# Patient Record
Sex: FEMALE | Race: WHITE | NOT HISPANIC OR LATINO | URBAN - METROPOLITAN AREA
[De-identification: names, ages, dates, MRNs, and addresses within clinical notes are randomized per-mention and may not be internally consistent; named-entity substitution may affect disease eponyms.]

---

## 2018-11-21 ENCOUNTER — OFFICE VISIT (OUTPATIENT)
Dept: URBAN - METROPOLITAN AREA CLINIC 18 | Facility: CLINIC | Age: 73
End: 2018-11-21
Payer: MEDICARE

## 2018-11-21 DIAGNOSIS — H43.813 VITREOUS DEGENERATION, BILATERAL: ICD-10-CM

## 2018-11-21 DIAGNOSIS — H35.3291: Primary | ICD-10-CM

## 2018-11-21 DIAGNOSIS — Z96.1 PRESENCE OF INTRAOCULAR LENS: ICD-10-CM

## 2018-11-21 DIAGNOSIS — H35.3192 NONEXUDATIVE AGE-RELATED MACULAR DEGENERATION, UNSPECIFIED EYE, INTERMEDIATE DRY STAGE: ICD-10-CM

## 2018-11-21 PROCEDURE — 92004 COMPRE OPH EXAM NEW PT 1/>: CPT | Performed by: OPTOMETRIST

## 2018-11-21 PROCEDURE — 92134 CPTRZ OPH DX IMG PST SGM RTA: CPT | Performed by: OPTOMETRIST

## 2018-11-21 PROCEDURE — 92014 COMPRE OPH EXAM EST PT 1/>: CPT | Performed by: OPTOMETRIST

## 2018-11-21 ASSESSMENT — INTRAOCULAR PRESSURE
OD: 13
OS: 12

## 2018-11-21 NOTE — IMPRESSION/PLAN
Impression: Nonexudative age-related macular degeneration, unspecified eye, intermediate dry stage: H35.3192. OD. Plan: Discussed diagnosis in detail with patient. No treatment is required at this time. Will continue to observe condition and or symptoms.

## 2018-11-21 NOTE — IMPRESSION/PLAN
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 OS. Plan: Macular degeneration, wet type, appears progressive. Will refer the patient to a retinal specialist for consult.

## 2018-12-18 ENCOUNTER — OFFICE VISIT (OUTPATIENT)
Dept: URBAN - METROPOLITAN AREA CLINIC 17 | Facility: CLINIC | Age: 73
End: 2018-12-18
Payer: MEDICARE

## 2018-12-18 DIAGNOSIS — H35.3221 EXUDATIVE AGE-RELATED MACULAR DEGENERATION OF LEFT EYE W/ ACTIVE CHOROIDAL NEOVASCULARIZATION: Primary | ICD-10-CM

## 2018-12-18 DIAGNOSIS — H35.3112 NONEXUDATIVE AGE-RELATED MACULAR DEGENERATION, RIGHT EYE, INTERMEDIATE DRY STAGE: ICD-10-CM

## 2018-12-18 PROCEDURE — 92014 COMPRE OPH EXAM EST PT 1/>: CPT | Performed by: OPHTHALMOLOGY

## 2018-12-18 PROCEDURE — 67028 INJECTION EYE DRUG: CPT | Performed by: OPHTHALMOLOGY

## 2018-12-18 PROCEDURE — 92134 CPTRZ OPH DX IMG PST SGM RTA: CPT | Performed by: OPHTHALMOLOGY

## 2018-12-18 RX ORDER — TRAZODONE HYDROCHLORIDE 100 MG/1
100 MG TABLET ORAL
Qty: 0 | Refills: 0 | Status: ACTIVE
Start: 2018-12-18

## 2018-12-18 ASSESSMENT — INTRAOCULAR PRESSURE
OS: 13
OD: 15

## 2018-12-18 NOTE — IMPRESSION/PLAN
Impression: Exudative age-related macular degeneration of left eye w/ active choroidal neovascularization: H35.3221. Plan: Never been treated before. Exam today w/active CNVM. Rec a 3 mos series of avastin inj's. Will proceed #1/3 today. R/B/A d/w pt today. Consent obtained. F/u 4-5 wks #2 avastin (no OCT/DFE needed) Pt plans to return to [place] in April 2019

## 2018-12-18 NOTE — IMPRESSION/PLAN
Impression: Nonexudative age-related macular degeneration, right eye, intermediate dry stage: H35.3112. Plan: [employer] grid use instructions discussed w/pt today. Recommend AREDS eye vitamins. Instructed to call clinic urgently if new/worsening metamorphopsia or scotoma. No treatment is required at this time.

## 2019-01-21 ENCOUNTER — PROCEDURE (OUTPATIENT)
Dept: URBAN - METROPOLITAN AREA CLINIC 17 | Facility: CLINIC | Age: 74
End: 2019-01-21
Payer: MEDICARE

## 2019-01-21 PROCEDURE — 67028 INJECTION EYE DRUG: CPT | Performed by: OPHTHALMOLOGY

## 2019-02-27 ENCOUNTER — PROCEDURE (OUTPATIENT)
Dept: URBAN - METROPOLITAN AREA CLINIC 17 | Facility: CLINIC | Age: 74
End: 2019-02-27
Payer: MEDICARE

## 2019-02-27 PROCEDURE — 67028 INJECTION EYE DRUG: CPT | Performed by: OPHTHALMOLOGY

## 2019-04-02 ENCOUNTER — OFFICE VISIT (OUTPATIENT)
Dept: URBAN - METROPOLITAN AREA CLINIC 17 | Facility: CLINIC | Age: 74
End: 2019-04-02
Payer: MEDICARE

## 2019-04-02 PROCEDURE — 99214 OFFICE O/P EST MOD 30 MIN: CPT | Performed by: OPHTHALMOLOGY

## 2019-04-02 PROCEDURE — 92134 CPTRZ OPH DX IMG PST SGM RTA: CPT | Performed by: OPHTHALMOLOGY

## 2019-04-02 PROCEDURE — 67028 INJECTION EYE DRUG: CPT | Performed by: OPHTHALMOLOGY

## 2019-04-02 ASSESSMENT — INTRAOCULAR PRESSURE
OD: 11
OS: 12

## 2019-04-02 NOTE — IMPRESSION/PLAN
Impression: Exudative age-related macular degeneration of left eye w/ active choroidal neovascularization: H35.3221. Plan: S/p avastin #1/3 on 12/18/18, #2/3 on 1/21/19 and #3/3 on 2/27/19 (5 wks ago). OCT today w/no active CNVM. Rec maintenance dose of avastin inj today. Pt would like to proceed. R/B/A d/w pt. Consent obtained. Will proceed w/avastin inj OS today. Pt plans to return to [place] in April 2019 -will provide exam notes for pt today.  PRN w/Dr. Oksana Zaragoza

## 2019-12-10 ENCOUNTER — OFFICE VISIT (OUTPATIENT)
Dept: URBAN - METROPOLITAN AREA CLINIC 17 | Facility: CLINIC | Age: 74
End: 2019-12-10
Payer: MEDICARE

## 2019-12-10 PROCEDURE — 99214 OFFICE O/P EST MOD 30 MIN: CPT | Performed by: OPHTHALMOLOGY

## 2019-12-10 PROCEDURE — 92134 CPTRZ OPH DX IMG PST SGM RTA: CPT | Performed by: OPHTHALMOLOGY

## 2019-12-10 PROCEDURE — 67028 INJECTION EYE DRUG: CPT | Performed by: OPHTHALMOLOGY

## 2019-12-10 ASSESSMENT — INTRAOCULAR PRESSURE
OD: 17
OS: 16

## 2019-12-10 NOTE — IMPRESSION/PLAN
Impression: Exudative age-related macular degeneration of left eye w/ active choroidal neovascularization: H35.3221. OS. Plan: Pt returning from [place]. S/p LIVA 10/1/19 (10wks ago). Exam and OCT today w/no active CNVM. Gave pt the option obs vs treat and extend. Pt would like to proceed w/maintenance dose today. R/B/A d/w pt. Consent obtained. Administered SUNNY OS. F/u 10-12 wks dfe/oct/poss SUNNY.

## 2020-02-19 ENCOUNTER — OFFICE VISIT (OUTPATIENT)
Dept: URBAN - METROPOLITAN AREA CLINIC 17 | Facility: CLINIC | Age: 75
End: 2020-02-19
Payer: MEDICARE

## 2020-02-19 PROCEDURE — 92014 COMPRE OPH EXAM EST PT 1/>: CPT | Performed by: OPHTHALMOLOGY

## 2020-02-19 PROCEDURE — 92134 CPTRZ OPH DX IMG PST SGM RTA: CPT | Performed by: OPHTHALMOLOGY

## 2020-02-19 ASSESSMENT — INTRAOCULAR PRESSURE
OD: 14
OS: 13

## 2020-02-19 NOTE — IMPRESSION/PLAN
Impression: Nonexudative age-related macular degeneration, right eye, intermediate dry stage: H35.3112. OD. Condition: stable. Vision: vision affected. Plan: Discussed diagnosis in detail with patient. Discussed risks of progression with present condition. Exam shows the macula is stable. No treatment is needed at this time. Recommend observation for now. Continue taking the eye vitamins - AREDS 2 formula. OCT shows no IRF or SRF OD  .

## 2020-02-19 NOTE — IMPRESSION/PLAN
Impression: Exudative age-related macular degeneration of left eye w/ active choroidal neovascularization: H35.3221. OS. Condition: stable. Vision: vision affected. s/p AV OS 12/10/2019 w/ Dr. Viki Bear: Discussed diagnosis in detail with patient. No treatment is required at this time based on exam and OCT. Recommend close observation for now. Recommend patient start taking AREDS2 vitamins. Will reassess condition in 2 month.  OCT shows no IRF or SRF - stable